# Patient Record
Sex: MALE | Race: WHITE | Employment: OTHER | URBAN - METROPOLITAN AREA
[De-identification: names, ages, dates, MRNs, and addresses within clinical notes are randomized per-mention and may not be internally consistent; named-entity substitution may affect disease eponyms.]

---

## 2024-07-11 ENCOUNTER — HOSPITAL ENCOUNTER (OUTPATIENT)
Age: 69
Setting detail: THERAPIES SERIES
Discharge: HOME | End: 2024-07-11
Payer: MEDICARE

## 2024-07-11 PROCEDURE — 97162 PT EVAL MOD COMPLEX 30 MIN: CPT | Performed by: PHYSICAL THERAPIST

## 2024-07-11 PROCEDURE — 97110 THERAPEUTIC EXERCISES: CPT | Performed by: PHYSICAL THERAPIST

## 2024-07-11 NOTE — CONSULTS
strengthening      Evaluation Complexity:  History (Personal factors, comorbidities) [] 0 [x] 1-2 [] 3+   Exam (limitations, restrictions) [] 1-2 [x] 3 [] 4+   Clinical presentation (progression) [x] Stable [] Evolving  [] Unstable   Decision Making [x] Low [] Moderate [] High    [] Low Complexity [x] Moderate Complexity [] High Complexity       Treatment Charges: Mins Units   [x] Evaluation       []  Low       [x]  Moderate       []  High 25 1   []  Modalities     [x]  Ther Exercise 22 2   [x]  Manual Therapy 6 0   []  Ther Activities     []  Aquatics     []  Vasocompression     []  Other 53 3     TOTAL BILLABLE TIME: 53'      Time in:0935     Time out:1020    Electronically signed by: ALAN CURRY PT        Physician Signature:________________________________Date:__________________  By signing above or cosigning this note, I have reviewed this plan of care and certify a need for medically necessary rehabilitation services.     *PLEASE SIGN ABOVE AND FAX BACK ALL PAGES*

## 2024-07-16 ENCOUNTER — HOSPITAL ENCOUNTER (OUTPATIENT)
Age: 69
Setting detail: THERAPIES SERIES
Discharge: HOME | End: 2024-07-16
Payer: MEDICARE

## 2024-07-16 PROCEDURE — 97140 MANUAL THERAPY 1/> REGIONS: CPT | Performed by: PHYSICAL THERAPIST

## 2024-07-16 PROCEDURE — 97110 THERAPEUTIC EXERCISES: CPT | Performed by: PHYSICAL THERAPIST

## 2024-07-16 NOTE — FLOWSHEET NOTE
[] [facility]  Outpatient Rehabilitation &  Therapy  [address]  [phone]  [phone] [] [facility]  Outpatient Rehabilitation &  Therapy  [address]  P: [phone]  F: [phone] [] [facility]  Outpatient Rehabilitation &  Therapy  [address]  P: [phone]  F: [phone] [] [facility]  Outpatient Rehabilitation &  Therapy  [address]  [phone]  [phone] [] [facility]  Outpatient Rehabilitation &  Therapy  [address]   P: [phone]  F: [phone]  [x] [facility]  Outpatient Rehabilitation &  Therapy  [address]  P: [phone]  F: [phone] [] [facility]  Outpatient Rehabilitation &  Therapy  [address]  P: [phone]  F: [phone] [] [facility]  Outpatient Rehabilitation &  Therapy  [address]  P: [phone]  F: [phone] [] [facility]  Outpatient Rehabilitation &  Therapy  [address]  P: [phone]  F: [phone]  [] [facility] Outpatient Rehabilitation &  Therapy  [address]  P: [phone]  F: [phone]     Physical Therapy Daily Treatment Note    Date:  2024  Patient Name:  Capo Brooks    :  1955  MRN: [id number]  Patient: Capo Brooks              : 1955                      MRN: [id number]  Physician: Tim Ordoñez MD                                        Insurance: MEDICARE PART A AND B   Medical Diagnosis: M51.36, M54.16              Rehab Codes: M54.50, M25.60  Onset Date: 7/15/23                                  Next 's appt: TBD   Visit# / total visits: ; Progress note for Medicare patient due at visit 10     Cancels/No Shows: 0/0  Estimated Medicare Charges to 24 : $194    Subjective:  Reports an

## 2024-07-18 ENCOUNTER — HOSPITAL ENCOUNTER (OUTPATIENT)
Age: 69
Setting detail: THERAPIES SERIES
Discharge: HOME | End: 2024-07-18
Payer: MEDICARE

## 2024-07-18 PROCEDURE — 97140 MANUAL THERAPY 1/> REGIONS: CPT | Performed by: PHYSICAL THERAPIST

## 2024-07-18 PROCEDURE — 97110 THERAPEUTIC EXERCISES: CPT | Performed by: PHYSICAL THERAPIST

## 2024-07-18 NOTE — FLOWSHEET NOTE
[] [facility]  Outpatient Rehabilitation &  Therapy  [address]  [phone]  [phone] [] [facility]  Outpatient Rehabilitation &  Therapy  [address]  P: [phone]  F: [phone] [] [facility]  Outpatient Rehabilitation &  Therapy  [address]  P: [phone]  F: [phone] [] [facility]  Outpatient Rehabilitation &  Therapy  [address]  [phone]  [phone] [] [facility]  Outpatient Rehabilitation &  Therapy  [address]   P: [phone]  F: [phone]  [x] [facility]  Outpatient Rehabilitation &  Therapy  [address]  P: [phone]  F: [phone] [] [facility]  Outpatient Rehabilitation &  Therapy  [address]  P: [phone]  F: [phone] [] [facility]  Outpatient Rehabilitation &  Therapy  [address]  P: [phone]  F: [phone] [] [facility]  Outpatient Rehabilitation &  Therapy  [address]  P: [phone]  F: [phone]  [] [facility] Outpatient Rehabilitation &  Therapy  [address]  P: [phone]  F: [phone]     Physical Therapy Daily Treatment Note    Date:  2024  Patient Name:  Capo Brooks    :  1955  MRN: [id number]  Patient: Capo Brooks              : 1955                      MRN: [id number]  Physician: Tim Ordoñez MD                                        Insurance: MEDICARE PART A AND B   Medical Diagnosis: M51.36, M54.16              Rehab Codes: M54.50, M25.60  Onset Date: 7/15/23                                  Next 's appt: TBD   Visit# / total visits: 3/12; Progress note for Medicare patient due at visit 10     Cancels/No Shows: 0/0  Estimated Medicare Charges to 24 : $194    Subjective:  Slight

## 2024-08-08 ENCOUNTER — HOSPITAL ENCOUNTER (OUTPATIENT)
Age: 69
Setting detail: THERAPIES SERIES
Discharge: HOME | End: 2024-08-08
Payer: MEDICARE

## 2024-08-08 PROCEDURE — 97140 MANUAL THERAPY 1/> REGIONS: CPT | Performed by: PHYSICAL THERAPIST

## 2024-08-08 PROCEDURE — 97110 THERAPEUTIC EXERCISES: CPT | Performed by: PHYSICAL THERAPIST

## 2024-08-08 NOTE — FLOWSHEET NOTE
LSP and begin appropriate strengthening  ? Pain: Eliminate constant L LBP to allow pain free sitting   ? ROM: Lumbar ext to 45 degrees to assist with symptom relief and control   LTG: (to be met in 12 treatments)  ? Strength: 5/5 L hip abd, ext to allow efficient and unrestricted amb  ? Function: Reduce Back Index to <10% deficits.   Patient to be independent with home exercise program as demonstrated by performance with correct form without cues.       Pt. Education:  [x] Yes  [] No  [x] Reviewed Prior HEP/Ed  Method of Education: [x] Verbal-Provided new sequence of ex, outlined rationale, reviewed mechanics    [x] Demo  [x] Written  Comprehension of Education:  [x] Verbalizes understanding.  [x] Demonstrates understanding.  [x] Needs review.  [] Demonstrates/verbalizes HEP/Ed previously given.     Access Code: [email or web address]  URL: [email or web address]  Date: 08/08/2024  Prepared by: Alan Curry    Exercises  - Supine Lower Trunk Rotation  - 1 x daily - 7 x weekly - 1 sets - 10 reps - 3 sec hold  - Supine Double Knee to Chest Modified  - 1 x daily - 7 x weekly - 1 sets - 10 reps - 5 sec hold  - Supine Hamstring Stretch  - 1 x daily - 7 x weekly - 1 sets - 10 reps - 3 sec hold  - Supine Bridge  - 1 x daily - 7 x weekly - 1 sets - 20 reps - 3 sec hold  - Reverse Crunch from 90/90 with Mini Swiss Ball Between Knees  - 1 x daily - 7 x weekly - 1 sets - 20 reps - 3 sec hold  - Straight Leg Raise with Opposite Hip and Knee Flexion  - 1 x daily - 7 x weekly - 1 sets - 20 reps - 3 sec hold  - Prone Press Up  - 1 x daily - 7 x weekly - 1 sets - 10 reps - 3 sec hold    Plan: [x] Continue current frequency toward long and short term goals.    [x] Specific Instructions for subsequent treatments: progress with stabilization and strengthening, along with extension approach.      Time In:  0815           Time Out: 0905    Electronically signed by:  ALAN CURRY, PT

## 2024-08-20 ENCOUNTER — APPOINTMENT (OUTPATIENT)
Dept: CT IMAGING | Age: 69
DRG: 149 | End: 2024-08-20
Payer: MEDICARE

## 2024-08-20 ENCOUNTER — APPOINTMENT (OUTPATIENT)
Dept: GENERAL RADIOLOGY | Age: 69
DRG: 149 | End: 2024-08-20
Payer: MEDICARE

## 2024-08-20 ENCOUNTER — HOSPITAL ENCOUNTER (INPATIENT)
Age: 69
LOS: 1 days | Discharge: HOME | DRG: 149 | End: 2024-08-21
Attending: STUDENT IN AN ORGANIZED HEALTH CARE EDUCATION/TRAINING PROGRAM | Admitting: STUDENT IN AN ORGANIZED HEALTH CARE EDUCATION/TRAINING PROGRAM
Payer: MEDICARE

## 2024-08-20 DIAGNOSIS — R42 DIZZINESS: Primary | ICD-10-CM

## 2024-08-20 PROBLEM — I10 ESSENTIAL HYPERTENSION: Status: ACTIVE | Noted: 2024-08-20

## 2024-08-20 PROBLEM — E87.6 HYPOKALEMIA: Status: ACTIVE | Noted: 2024-08-20

## 2024-08-20 PROBLEM — N40.0 BPH (BENIGN PROSTATIC HYPERPLASIA): Status: ACTIVE | Noted: 2024-08-20

## 2024-08-20 LAB
ANION GAP SERPL CALCULATED.3IONS-SCNC: 14 MMOL/L (ref 9–17)
BASOPHILS # BLD: 0.1 K/UL (ref 0–0.2)
BASOPHILS NFR BLD: 2 % (ref 0–2)
BUN SERPL-MCNC: 13 MG/DL (ref 8–23)
CALCIUM SERPL-MCNC: 8.8 MG/DL (ref 8.6–10.4)
CHLORIDE SERPL-SCNC: 97 MMOL/L (ref 98–107)
CO2 SERPL-SCNC: 23 MMOL/L (ref 20–31)
CREAT SERPL-MCNC: 0.7 MG/DL (ref 0.7–1.2)
EOSINOPHIL # BLD: 0.1 K/UL (ref 0–0.4)
EOSINOPHILS RELATIVE PERCENT: 3 % (ref 1–4)
ERYTHROCYTE [DISTWIDTH] IN BLOOD BY AUTOMATED COUNT: 13 % (ref 12.5–15.4)
GFR, ESTIMATED: >90 ML/MIN/1.73M2
GLUCOSE SERPL-MCNC: 155 MG/DL (ref 70–99)
HCT VFR BLD AUTO: 36.6 % (ref 41–53)
HGB BLD-MCNC: 12.3 G/DL (ref 13.5–17.5)
LYMPHOCYTES NFR BLD: 1.5 K/UL (ref 1–4.8)
LYMPHOCYTES RELATIVE PERCENT: 32 % (ref 24–44)
MCH RBC QN AUTO: 29.8 PG (ref 26–34)
MCHC RBC AUTO-ENTMCNC: 33.5 G/DL (ref 31–37)
MCV RBC AUTO: 88.8 FL (ref 80–100)
MONOCYTES NFR BLD: 0.5 K/UL (ref 0.1–1.2)
MONOCYTES NFR BLD: 11 % (ref 2–11)
NEUTROPHILS NFR BLD: 52 % (ref 36–66)
NEUTS SEG NFR BLD: 2.5 K/UL (ref 1.8–7.7)
PLATELET # BLD AUTO: 332 K/UL (ref 140–450)
PMV BLD AUTO: 6.7 FL (ref 6–12)
POTASSIUM SERPL-SCNC: 3.3 MMOL/L (ref 3.7–5.3)
RBC # BLD AUTO: 4.12 M/UL (ref 4.5–5.9)
SARS-COV-2 RDRP RESP QL NAA+PROBE: NOT DETECTED
SODIUM SERPL-SCNC: 134 MMOL/L (ref 135–144)
SPECIMEN DESCRIPTION: NORMAL
TROPONIN I SERPL HS-MCNC: 8 NG/L (ref 0–22)
WBC OTHER # BLD: 4.7 K/UL (ref 3.5–11)

## 2024-08-20 PROCEDURE — 6370000000 HC RX 637 (ALT 250 FOR IP): Performed by: NURSE PRACTITIONER

## 2024-08-20 PROCEDURE — 99285 EMERGENCY DEPT VISIT HI MDM: CPT

## 2024-08-20 PROCEDURE — 96374 THER/PROPH/DIAG INJ IV PUSH: CPT

## 2024-08-20 PROCEDURE — 1200000000 HC SEMI PRIVATE

## 2024-08-20 PROCEDURE — 99222 1ST HOSP IP/OBS MODERATE 55: CPT

## 2024-08-20 PROCEDURE — 70450 CT HEAD/BRAIN W/O DYE: CPT

## 2024-08-20 PROCEDURE — 70498 CT ANGIOGRAPHY NECK: CPT

## 2024-08-20 PROCEDURE — 71045 X-RAY EXAM CHEST 1 VIEW: CPT

## 2024-08-20 PROCEDURE — 93005 ELECTROCARDIOGRAM TRACING: CPT | Performed by: NURSE PRACTITIONER

## 2024-08-20 PROCEDURE — 6360000004 HC RX CONTRAST MEDICATION: Performed by: STUDENT IN AN ORGANIZED HEALTH CARE EDUCATION/TRAINING PROGRAM

## 2024-08-20 PROCEDURE — 6370000000 HC RX 637 (ALT 250 FOR IP): Performed by: STUDENT IN AN ORGANIZED HEALTH CARE EDUCATION/TRAINING PROGRAM

## 2024-08-20 PROCEDURE — 96375 TX/PRO/DX INJ NEW DRUG ADDON: CPT

## 2024-08-20 PROCEDURE — 2580000003 HC RX 258: Performed by: NURSE PRACTITIONER

## 2024-08-20 PROCEDURE — 85025 COMPLETE CBC W/AUTO DIFF WBC: CPT

## 2024-08-20 PROCEDURE — 6370000000 HC RX 637 (ALT 250 FOR IP)

## 2024-08-20 PROCEDURE — 6360000002 HC RX W HCPCS: Performed by: STUDENT IN AN ORGANIZED HEALTH CARE EDUCATION/TRAINING PROGRAM

## 2024-08-20 PROCEDURE — 80048 BASIC METABOLIC PNL TOTAL CA: CPT

## 2024-08-20 PROCEDURE — 84484 ASSAY OF TROPONIN QUANT: CPT

## 2024-08-20 PROCEDURE — 36415 COLL VENOUS BLD VENIPUNCTURE: CPT

## 2024-08-20 PROCEDURE — 2580000003 HC RX 258: Performed by: STUDENT IN AN ORGANIZED HEALTH CARE EDUCATION/TRAINING PROGRAM

## 2024-08-20 PROCEDURE — 6360000002 HC RX W HCPCS: Performed by: NURSE PRACTITIONER

## 2024-08-20 PROCEDURE — 87635 SARS-COV-2 COVID-19 AMP PRB: CPT

## 2024-08-20 RX ORDER — ONDANSETRON 2 MG/ML
4 INJECTION INTRAMUSCULAR; INTRAVENOUS EVERY 6 HOURS PRN
Status: DISCONTINUED | OUTPATIENT
Start: 2024-08-20 | End: 2024-08-21 | Stop reason: HOSPADM

## 2024-08-20 RX ORDER — POTASSIUM CHLORIDE 20 MEQ/1
40 TABLET, EXTENDED RELEASE ORAL ONCE
Status: COMPLETED | OUTPATIENT
Start: 2024-08-20 | End: 2024-08-20

## 2024-08-20 RX ORDER — TAMSULOSIN HYDROCHLORIDE 0.4 MG/1
0.4 CAPSULE ORAL DAILY
COMMUNITY

## 2024-08-20 RX ORDER — SODIUM CHLORIDE 0.9 % (FLUSH) 0.9 %
10 SYRINGE (ML) INJECTION PRN
Status: DISCONTINUED | OUTPATIENT
Start: 2024-08-20 | End: 2024-08-21 | Stop reason: HOSPADM

## 2024-08-20 RX ORDER — ONDANSETRON 4 MG/1
4 TABLET, ORALLY DISINTEGRATING ORAL EVERY 8 HOURS PRN
Status: DISCONTINUED | OUTPATIENT
Start: 2024-08-20 | End: 2024-08-21 | Stop reason: HOSPADM

## 2024-08-20 RX ORDER — 0.9 % SODIUM CHLORIDE 0.9 %
1000 INTRAVENOUS SOLUTION INTRAVENOUS ONCE
Status: COMPLETED | OUTPATIENT
Start: 2024-08-20 | End: 2024-08-20

## 2024-08-20 RX ORDER — MECLIZINE HCL 12.5 MG/1
12.5 TABLET ORAL 3 TIMES DAILY PRN
Status: DISCONTINUED | OUTPATIENT
Start: 2024-08-20 | End: 2024-08-21 | Stop reason: HOSPADM

## 2024-08-20 RX ORDER — ONDANSETRON 2 MG/ML
4 INJECTION INTRAMUSCULAR; INTRAVENOUS ONCE
Status: COMPLETED | OUTPATIENT
Start: 2024-08-20 | End: 2024-08-20

## 2024-08-20 RX ORDER — MAGNESIUM SULFATE IN WATER 40 MG/ML
2000 INJECTION, SOLUTION INTRAVENOUS PRN
Status: DISCONTINUED | OUTPATIENT
Start: 2024-08-20 | End: 2024-08-21 | Stop reason: HOSPADM

## 2024-08-20 RX ORDER — POLYETHYLENE GLYCOL 3350 17 G/17G
17 POWDER, FOR SOLUTION ORAL DAILY PRN
Status: DISCONTINUED | OUTPATIENT
Start: 2024-08-20 | End: 2024-08-21 | Stop reason: HOSPADM

## 2024-08-20 RX ORDER — ENOXAPARIN SODIUM 100 MG/ML
40 INJECTION SUBCUTANEOUS DAILY
Status: DISCONTINUED | OUTPATIENT
Start: 2024-08-20 | End: 2024-08-21 | Stop reason: HOSPADM

## 2024-08-20 RX ORDER — SODIUM CHLORIDE 9 MG/ML
INJECTION, SOLUTION INTRAVENOUS CONTINUOUS
Status: DISCONTINUED | OUTPATIENT
Start: 2024-08-20 | End: 2024-08-21 | Stop reason: HOSPADM

## 2024-08-20 RX ORDER — LORAZEPAM 2 MG/ML
1 INJECTION INTRAMUSCULAR ONCE
Status: COMPLETED | OUTPATIENT
Start: 2024-08-20 | End: 2024-08-20

## 2024-08-20 RX ORDER — MECLIZINE HCL 12.5 MG/1
25 TABLET ORAL ONCE
Status: COMPLETED | OUTPATIENT
Start: 2024-08-20 | End: 2024-08-20

## 2024-08-20 RX ORDER — TAMSULOSIN HYDROCHLORIDE 0.4 MG/1
0.4 CAPSULE ORAL DAILY
Status: DISCONTINUED | OUTPATIENT
Start: 2024-08-20 | End: 2024-08-21 | Stop reason: HOSPADM

## 2024-08-20 RX ORDER — LISINOPRIL 10 MG/1
10 TABLET ORAL DAILY
Status: DISCONTINUED | OUTPATIENT
Start: 2024-08-20 | End: 2024-08-21 | Stop reason: HOSPADM

## 2024-08-20 RX ORDER — ACETAMINOPHEN 650 MG/1
650 SUPPOSITORY RECTAL EVERY 6 HOURS PRN
Status: DISCONTINUED | OUTPATIENT
Start: 2024-08-20 | End: 2024-08-21 | Stop reason: HOSPADM

## 2024-08-20 RX ORDER — POTASSIUM CHLORIDE 20 MEQ/1
40 TABLET, EXTENDED RELEASE ORAL PRN
Status: DISCONTINUED | OUTPATIENT
Start: 2024-08-20 | End: 2024-08-21 | Stop reason: HOSPADM

## 2024-08-20 RX ORDER — 0.9 % SODIUM CHLORIDE 0.9 %
80 INTRAVENOUS SOLUTION INTRAVENOUS ONCE
Status: DISCONTINUED | OUTPATIENT
Start: 2024-08-20 | End: 2024-08-21 | Stop reason: HOSPADM

## 2024-08-20 RX ORDER — SODIUM CHLORIDE 9 MG/ML
INJECTION, SOLUTION INTRAVENOUS PRN
Status: DISCONTINUED | OUTPATIENT
Start: 2024-08-20 | End: 2024-08-21 | Stop reason: HOSPADM

## 2024-08-20 RX ORDER — SODIUM CHLORIDE 0.9 % (FLUSH) 0.9 %
5-40 SYRINGE (ML) INJECTION PRN
Status: DISCONTINUED | OUTPATIENT
Start: 2024-08-20 | End: 2024-08-21 | Stop reason: HOSPADM

## 2024-08-20 RX ORDER — LISINOPRIL 10 MG/1
10 TABLET ORAL DAILY
COMMUNITY

## 2024-08-20 RX ORDER — POTASSIUM CHLORIDE 7.45 MG/ML
10 INJECTION INTRAVENOUS PRN
Status: DISCONTINUED | OUTPATIENT
Start: 2024-08-20 | End: 2024-08-21 | Stop reason: HOSPADM

## 2024-08-20 RX ORDER — ACETAMINOPHEN 325 MG/1
650 TABLET ORAL EVERY 6 HOURS PRN
Status: DISCONTINUED | OUTPATIENT
Start: 2024-08-20 | End: 2024-08-21 | Stop reason: HOSPADM

## 2024-08-20 RX ORDER — SODIUM CHLORIDE 0.9 % (FLUSH) 0.9 %
5-40 SYRINGE (ML) INJECTION EVERY 12 HOURS SCHEDULED
Status: DISCONTINUED | OUTPATIENT
Start: 2024-08-20 | End: 2024-08-21 | Stop reason: HOSPADM

## 2024-08-20 RX ADMIN — ENOXAPARIN SODIUM 40 MG: 100 INJECTION SUBCUTANEOUS at 20:41

## 2024-08-20 RX ADMIN — SODIUM CHLORIDE, PRESERVATIVE FREE 10 ML: 5 INJECTION INTRAVENOUS at 15:01

## 2024-08-20 RX ADMIN — SODIUM CHLORIDE 1000 ML: 9 INJECTION, SOLUTION INTRAVENOUS at 12:51

## 2024-08-20 RX ADMIN — IOPAMIDOL 75 ML: 755 INJECTION, SOLUTION INTRAVENOUS at 15:01

## 2024-08-20 RX ADMIN — MECLIZINE 12.5 MG: 12.5 TABLET ORAL at 20:41

## 2024-08-20 RX ADMIN — SODIUM CHLORIDE, PRESERVATIVE FREE 10 ML: 5 INJECTION INTRAVENOUS at 20:41

## 2024-08-20 RX ADMIN — TAMSULOSIN HYDROCHLORIDE 0.4 MG: 0.4 CAPSULE ORAL at 20:41

## 2024-08-20 RX ADMIN — ONDANSETRON 4 MG: 2 INJECTION INTRAMUSCULAR; INTRAVENOUS at 20:41

## 2024-08-20 RX ADMIN — SODIUM CHLORIDE: 9 INJECTION, SOLUTION INTRAVENOUS at 20:47

## 2024-08-20 RX ADMIN — ONDANSETRON 4 MG: 2 INJECTION INTRAMUSCULAR; INTRAVENOUS at 12:52

## 2024-08-20 RX ADMIN — Medication 80 ML: at 15:02

## 2024-08-20 RX ADMIN — SODIUM CHLORIDE 1000 ML: 9 INJECTION, SOLUTION INTRAVENOUS at 14:03

## 2024-08-20 RX ADMIN — LORAZEPAM 1 MG: 2 INJECTION, SOLUTION INTRAMUSCULAR; INTRAVENOUS at 15:52

## 2024-08-20 RX ADMIN — POTASSIUM CHLORIDE 40 MEQ: 1500 TABLET, EXTENDED RELEASE ORAL at 20:03

## 2024-08-20 RX ADMIN — MECLIZINE 25 MG: 12.5 TABLET ORAL at 12:53

## 2024-08-20 ASSESSMENT — ENCOUNTER SYMPTOMS
DIARRHEA: 0
SHORTNESS OF BREATH: 0
ABDOMINAL PAIN: 0
COUGH: 0
WHEEZING: 0
CONSTIPATION: 0
VOMITING: 1
NAUSEA: 1

## 2024-08-20 ASSESSMENT — PAIN - FUNCTIONAL ASSESSMENT: PAIN_FUNCTIONAL_ASSESSMENT: NONE - DENIES PAIN

## 2024-08-20 NOTE — ED PROVIDER NOTES
[facility] Emergency Department      Pt Name: Capo Brooks  MRN: [id number]  Birthdate 1955  Date of evaluation: 8/20/2024    EMERGENCY DEPARTMENT ENCOUNTER      PERTINENT ATTENDING PHYSICIAN COMMENTS:      Faculty Attestation    I performed a history and physical examination of the patient and discussed management with the mid level provideer. I reviewed the mid level provider's note and agree with the documented findings and plan of care.Any areas of disagreement are noted on the chart. I was personally present for the key portions of any procedures. I have documented in the chart those procedures where I was not present during the key portions. I have reviewed the emergency nurses triage note. I agree with the chief complaint, past medical history, past surgical history, allergies, medications, social and family history as documented unless otherwise noted below. Documentation of the HPI, Physical Exam and Medical Decision Making performed by medical students or scribes is based on my personal performance of the HPI, PE and MDM. For Residents/Physician Assistant/ Nurse Practitioner cases/documentation I have personally evaluated this patient and have completed at least one if not all key elements of the E/M (history, physical exam, and MDM). Additional findings are as noted.    CHIEF COMPLAINT       Chief Complaint   Patient presents with    Dizziness    Nausea    Emesis     Pt arrives via ems from home dt co nausea, emesis, and dizziness.        HISTORY OF PRESENT ILLNESS    Capo Brooks is a 69 y.o. male who presents to the emergency department via EMS due to nausea, emesis and dizziness.  Denies any syncopal episode.  Denies any chest pain, difficulty breathing, numbness or tingling.  Patient reports that he has had similar dizziness in the past but it is never lasted this long, never been evaluated for the symptoms.  He denies any cough or congestion.  Denies any visual changes, was given 
Quality 110: Preventive Care And Screening: Influenza Immunization: Influenza Immunization not Administered for Documented Reasons.
Quality 431: Preventive Care And Screening: Unhealthy Alcohol Use - Screening: Patient identified as an unhealthy alcohol user when screened for unhealthy alcohol use using a systematic screening method and received brief counseling
Quality 130: Documentation Of Current Medications In The Medical Record: Current Medications Documented
Quality 111:Pneumonia Vaccination Status For Older Adults: Pneumococcal Vaccination not Administered or Previously Received, Reason not Otherwise Specified
is intact.      Motor: Motor function is intact. No weakness, abnormal muscle tone or pronator drift.      Coordination: Coordination is intact. Coordination normal. Finger-Nose-Finger Test normal.      Comments: Pupils area equal and reactive.          MEDICAL DECISION MAKING     This is a nontoxic-appearing 69-year-old male who presents to the emergency department via EMS for evaluation evaluation of dizziness nausea and vomiting that began this morning around 11.  Patient has history of vertigo in the past.  CBC shows mildly decreased hemoglobin at 12.3, chemistry shows sodium 134 potassium 3.3 chloride 97 glucose 155 high-sensitivity troponin negative at 8.  EKG without any acute findings interpreted by my attending physician chest x-ray with no acute process.  CT of brain no inner cranial abnormality.  CTA head and neck with contrast shows unremarkable CTA of head and neck.  Patient was given dose of meclizine that did not make any improvement.  He is given a liter of fluids.  Patient states the meclizine relieved his nausea, but his dizziness is unchanged.  Patient was then given a dose of Ativan and attempt to treat the vertigo.  Dizziness continued.  Patient states does not feel he is able to go home.  Admission recommended.  Patient is agreeable.  Spoke to hospitalist who graciously accepted admission.  Patient will be in the hospital for further care and evaluation  DIAGNOSTIC RESULTS     EKG: All EKG's are interpreted by the Emergency Department Physician who either signs or Co-signs this chart in the absence of a cardiologist.        RADIOLOGY:   Non-plain film images such as CT, Ultrasound and MRI are read by the radiologist. Plain radiographic images are visualized and preliminarily interpreted by the emergency physician with the below findings:      Interpretation per the Radiologist below, if available at the time of this note:    CTA HEAD NECK W CONTRAST   Final Result   Unremarkable CTA of the head 
Quality 226: Preventive Care And Screening: Tobacco Use: Screening And Cessation Intervention: Patient screened for tobacco use and is an ex/non-smoker
Detail Level: Detailed
Quality 137: Melanoma: Continuity Of Care - Recall System: Patient information entered into a recall system that includes: target date for the next exam specified AND a process to follow up with patients regarding missed or unscheduled appointments

## 2024-08-20 NOTE — H&P
[facility]  Office: [phone]  Wilfrid Iqbal DO, Richmond Wynne DO, Jd Sawant DO, Rj Perez DO, Ora Pagan MD, Rabia Aguilar MD, Hector Wong MD, Jennifer Penny MD,  Kevin Monroy MD, Darci Baron MD, Merlyn Gonzales MD,  Aquiles Kwan DO, Marina Del Real MD, Evin Rojas MD, Dimas Iqbal DO, Joann Martins MD,  Aristeo Curry DO, Lamar Armando MD, Carmen Babcock MD, Ivett Renteria MD, Saloni Weinstein MD,  Fahad Rothman MD, Eliecer Hernandez MD, Harrison Ruggiero MD, Juanita Toney MD, Chava Gonsales MD, Florentin Ireland MD, Chele Perez DO, Carrillo Ngo DO, David Parker MD,  Deo Malagon MD, Shirley Waterhouse, CNP,  April Bell, CNP, Jordan Peters, CNP,  Sharron Boyd, DNP, Sarah Mckeon, CNP, Cesilia Holland, CNP, Veronika aDy, CNP, Dot Gonzalez, CNP, Jelly Mckenzie, PA-C, Mary Cummings PA-C, Caro Covington, CNP, Gila Hayward, CNP, Constanza Hairston, CNP, Adele Kebede, CNP, Laura Enriquez, CNS, Shanique King, CNP, Denita Pickard CNP, Tracy Schwab, CNP         [facility]   IN-PATIENT SERVICE   [facility]    HISTORY AND PHYSICAL EXAMINATION            Date:   8/20/2024  Patient name:  Capo Brooks  Date of admission:  8/20/2024 12:22 PM  MRN:   [id number]  Account:  [id number]  YOB: 1955  PCP:    Tim Ordoñez MD  Room:   AMY/AMY  Code Status:    No Order    Chief Complaint:     Chief Complaint   Patient presents with    Dizziness    Nausea    Emesis     Pt arrives via ems from home dt co nausea, emesis, and dizziness.      History Obtained From:     patient, electronic medical record    History of Present Illness:     Capo Brooks is a 69 y.o. Non- / non  male who presents with Dizziness, Nausea, and Emesis (Pt arrives via ems from home dt co nausea, emesis, and dizziness. )   and is admitted to the hospital for the management of Dizziness.    Patient is a very pleasant 69-year-old male who is

## 2024-08-21 ENCOUNTER — APPOINTMENT (OUTPATIENT)
Dept: MRI IMAGING | Age: 69
DRG: 149 | End: 2024-08-21
Payer: MEDICARE

## 2024-08-21 ENCOUNTER — APPOINTMENT (OUTPATIENT)
Age: 69
DRG: 149 | End: 2024-08-21
Payer: MEDICARE

## 2024-08-21 VITALS
RESPIRATION RATE: 16 BRPM | OXYGEN SATURATION: 100 % | SYSTOLIC BLOOD PRESSURE: 136 MMHG | WEIGHT: 143.3 LBS | BODY MASS INDEX: 18.99 KG/M2 | DIASTOLIC BLOOD PRESSURE: 94 MMHG | HEIGHT: 73 IN | HEART RATE: 66 BPM | TEMPERATURE: 97.7 F

## 2024-08-21 LAB
ANION GAP SERPL CALCULATED.3IONS-SCNC: 9 MMOL/L (ref 9–17)
BASOPHILS # BLD: 0 K/UL (ref 0–0.2)
BASOPHILS NFR BLD: 1 % (ref 0–2)
BUN SERPL-MCNC: 7 MG/DL (ref 8–23)
CALCIUM SERPL-MCNC: 8.2 MG/DL (ref 8.6–10.4)
CHLORIDE SERPL-SCNC: 101 MMOL/L (ref 98–107)
CO2 SERPL-SCNC: 24 MMOL/L (ref 20–31)
CREAT SERPL-MCNC: 0.7 MG/DL (ref 0.7–1.2)
ECHO AO ROOT DIAM: 3.4 CM
ECHO AO ROOT INDEX: 1.83 CM/M2
ECHO AV AREA PEAK VELOCITY: 3.1 CM2
ECHO AV AREA VTI: 2.9 CM2
ECHO AV AREA/BSA PEAK VELOCITY: 1.7 CM2/M2
ECHO AV AREA/BSA VTI: 1.6 CM2/M2
ECHO AV MEAN GRADIENT: 4 MMHG
ECHO AV MEAN VELOCITY: 1 M/S
ECHO AV PEAK GRADIENT: 8 MMHG
ECHO AV PEAK VELOCITY: 1.4 M/S
ECHO AV VELOCITY RATIO: 0.86
ECHO AV VTI: 30.2 CM
ECHO BSA: 1.83 M2
ECHO EST RA PRESSURE: 3 MMHG
ECHO IVC PROX: 1.7 CM
ECHO LA AREA 2C: 18.2 CM2
ECHO LA AREA 4C: 18 CM2
ECHO LA DIAMETER INDEX: 1.61 CM/M2
ECHO LA DIAMETER: 3 CM
ECHO LA MAJOR AXIS: 5.2 CM
ECHO LA MINOR AXIS: 5.2 CM
ECHO LA TO AORTIC ROOT RATIO: 0.88
ECHO LA VOL BP: 51 ML (ref 18–58)
ECHO LA VOL MOD A2C: 50 ML (ref 18–58)
ECHO LA VOL MOD A4C: 51 ML (ref 18–58)
ECHO LA VOL/BSA BIPLANE: 27 ML/M2 (ref 16–34)
ECHO LA VOLUME INDEX MOD A2C: 27 ML/M2 (ref 16–34)
ECHO LA VOLUME INDEX MOD A4C: 27 ML/M2 (ref 16–34)
ECHO LV E' LATERAL VELOCITY: 13 CM/S
ECHO LV E' SEPTAL VELOCITY: 10 CM/S
ECHO LV EDV A2C: 107 ML
ECHO LV EDV A4C: 98 ML
ECHO LV EDV INDEX A4C: 53 ML/M2
ECHO LV EDV NDEX A2C: 58 ML/M2
ECHO LV EF PHYSICIAN: 58 %
ECHO LV EJECTION FRACTION A2C: 58 %
ECHO LV EJECTION FRACTION A4C: 56 %
ECHO LV EJECTION FRACTION BIPLANE: 58 % (ref 55–100)
ECHO LV ESV A2C: 45 ML
ECHO LV ESV A4C: 44 ML
ECHO LV ESV INDEX A2C: 24 ML/M2
ECHO LV ESV INDEX A4C: 24 ML/M2
ECHO LV FRACTIONAL SHORTENING: 52 % (ref 28–44)
ECHO LV INTERNAL DIMENSION DIASTOLE INDEX: 2.47 CM/M2
ECHO LV INTERNAL DIMENSION DIASTOLIC: 4.6 CM (ref 4.2–5.9)
ECHO LV INTERNAL DIMENSION SYSTOLIC INDEX: 1.18 CM/M2
ECHO LV INTERNAL DIMENSION SYSTOLIC: 2.2 CM
ECHO LV IVSD: 1 CM (ref 0.6–1)
ECHO LV MASS 2D: 158.8 G (ref 88–224)
ECHO LV MASS INDEX 2D: 85.4 G/M2 (ref 49–115)
ECHO LV POSTERIOR WALL DIASTOLIC: 1 CM (ref 0.6–1)
ECHO LV RELATIVE WALL THICKNESS RATIO: 0.43
ECHO LVOT AREA: 3.8 CM2
ECHO LVOT AV VTI INDEX: 0.77
ECHO LVOT DIAM: 2.2 CM
ECHO LVOT MEAN GRADIENT: 3 MMHG
ECHO LVOT PEAK GRADIENT: 5 MMHG
ECHO LVOT PEAK VELOCITY: 1.2 M/S
ECHO LVOT STROKE VOLUME INDEX: 47.4 ML/M2
ECHO LVOT SV: 88.1 ML
ECHO LVOT VTI: 23.2 CM
ECHO MV A VELOCITY: 0.57 M/S
ECHO MV E DECELERATION TIME (DT): 171 MS
ECHO MV E VELOCITY: 0.85 M/S
ECHO MV E/A RATIO: 1.49
ECHO MV E/E' LATERAL: 6.54
ECHO MV E/E' RATIO (AVERAGED): 7.52
ECHO MV E/E' SEPTAL: 8.5
ECHO RIGHT VENTRICULAR SYSTOLIC PRESSURE (RVSP): 30 MMHG
ECHO RV TAPSE: 2.2 CM (ref 1.7–?)
ECHO TV REGURGITANT MAX VELOCITY: 2.58 M/S
ECHO TV REGURGITANT PEAK GRADIENT: 27 MMHG
EKG ATRIAL RATE: 58 BPM
EKG P AXIS: 77 DEGREES
EKG P-R INTERVAL: 184 MS
EKG Q-T INTERVAL: 460 MS
EKG QRS DURATION: 120 MS
EKG QTC CALCULATION (BAZETT): 451 MS
EKG R AXIS: 75 DEGREES
EKG T AXIS: 73 DEGREES
EKG VENTRICULAR RATE: 58 BPM
EOSINOPHIL # BLD: 0.1 K/UL (ref 0–0.4)
EOSINOPHILS RELATIVE PERCENT: 3 % (ref 1–4)
ERYTHROCYTE [DISTWIDTH] IN BLOOD BY AUTOMATED COUNT: 13.1 % (ref 12.5–15.4)
GFR, ESTIMATED: >90 ML/MIN/1.73M2
GLUCOSE SERPL-MCNC: 136 MG/DL (ref 70–99)
HCT VFR BLD AUTO: 33.5 % (ref 41–53)
HGB BLD-MCNC: 11.4 G/DL (ref 13.5–17.5)
LYMPHOCYTES NFR BLD: 1.1 K/UL (ref 1–4.8)
LYMPHOCYTES RELATIVE PERCENT: 27 % (ref 24–44)
MCH RBC QN AUTO: 29.9 PG (ref 26–34)
MCHC RBC AUTO-ENTMCNC: 34.2 G/DL (ref 31–37)
MCV RBC AUTO: 87.5 FL (ref 80–100)
MONOCYTES NFR BLD: 0.5 K/UL (ref 0.1–1.2)
MONOCYTES NFR BLD: 12 % (ref 2–11)
NEUTROPHILS NFR BLD: 57 % (ref 36–66)
NEUTS SEG NFR BLD: 2.3 K/UL (ref 1.8–7.7)
PLATELET # BLD AUTO: 288 K/UL (ref 140–450)
PMV BLD AUTO: 6.6 FL (ref 6–12)
POTASSIUM SERPL-SCNC: 4 MMOL/L (ref 3.7–5.3)
RBC # BLD AUTO: 3.83 M/UL (ref 4.5–5.9)
SODIUM SERPL-SCNC: 134 MMOL/L (ref 135–144)
WBC OTHER # BLD: 4 K/UL (ref 3.5–11)

## 2024-08-21 PROCEDURE — 6360000002 HC RX W HCPCS: Performed by: STUDENT IN AN ORGANIZED HEALTH CARE EDUCATION/TRAINING PROGRAM

## 2024-08-21 PROCEDURE — 93306 TTE W/DOPPLER COMPLETE: CPT

## 2024-08-21 PROCEDURE — 36415 COLL VENOUS BLD VENIPUNCTURE: CPT

## 2024-08-21 PROCEDURE — 6370000000 HC RX 637 (ALT 250 FOR IP): Performed by: PSYCHIATRY & NEUROLOGY

## 2024-08-21 PROCEDURE — 93306 TTE W/DOPPLER COMPLETE: CPT | Performed by: INTERNAL MEDICINE

## 2024-08-21 PROCEDURE — 70551 MRI BRAIN STEM W/O DYE: CPT

## 2024-08-21 PROCEDURE — 85025 COMPLETE CBC W/AUTO DIFF WBC: CPT

## 2024-08-21 PROCEDURE — 99223 1ST HOSP IP/OBS HIGH 75: CPT | Performed by: PSYCHIATRY & NEUROLOGY

## 2024-08-21 PROCEDURE — 97116 GAIT TRAINING THERAPY: CPT

## 2024-08-21 PROCEDURE — 80048 BASIC METABOLIC PNL TOTAL CA: CPT

## 2024-08-21 PROCEDURE — 99232 SBSQ HOSP IP/OBS MODERATE 35: CPT | Performed by: STUDENT IN AN ORGANIZED HEALTH CARE EDUCATION/TRAINING PROGRAM

## 2024-08-21 PROCEDURE — 97162 PT EVAL MOD COMPLEX 30 MIN: CPT

## 2024-08-21 PROCEDURE — 6370000000 HC RX 637 (ALT 250 FOR IP)

## 2024-08-21 RX ORDER — ASPIRIN 81 MG/1
81 TABLET, CHEWABLE ORAL DAILY
Qty: 30 TABLET | Refills: 3 | Status: SHIPPED | OUTPATIENT
Start: 2024-08-22

## 2024-08-21 RX ORDER — ATORVASTATIN CALCIUM 40 MG/1
80 TABLET, FILM COATED ORAL NIGHTLY
Status: DISCONTINUED | OUTPATIENT
Start: 2024-08-21 | End: 2024-08-21 | Stop reason: HOSPADM

## 2024-08-21 RX ORDER — MECLIZINE HCL 12.5 MG/1
25 TABLET ORAL 3 TIMES DAILY PRN
Qty: 90 TABLET | Refills: 2 | Status: SHIPPED | OUTPATIENT
Start: 2024-08-21 | End: 2024-11-19

## 2024-08-21 RX ORDER — ATORVASTATIN CALCIUM 80 MG/1
80 TABLET, FILM COATED ORAL NIGHTLY
Qty: 30 TABLET | Refills: 3 | Status: SHIPPED | OUTPATIENT
Start: 2024-08-21

## 2024-08-21 RX ORDER — MECLIZINE HCL 12.5 MG/1
12.5 TABLET ORAL EVERY 8 HOURS
Status: DISCONTINUED | OUTPATIENT
Start: 2024-08-21 | End: 2024-08-21 | Stop reason: HOSPADM

## 2024-08-21 RX ORDER — ASPIRIN 81 MG/1
81 TABLET, CHEWABLE ORAL DAILY
Status: DISCONTINUED | OUTPATIENT
Start: 2024-08-21 | End: 2024-08-21 | Stop reason: HOSPADM

## 2024-08-21 RX ADMIN — TAMSULOSIN HYDROCHLORIDE 0.4 MG: 0.4 CAPSULE ORAL at 09:46

## 2024-08-21 RX ADMIN — LISINOPRIL 10 MG: 10 TABLET ORAL at 09:46

## 2024-08-21 RX ADMIN — MECLIZINE 12.5 MG: 12.5 TABLET ORAL at 14:36

## 2024-08-21 RX ADMIN — ASPIRIN 81 MG: 81 TABLET, CHEWABLE ORAL at 17:21

## 2024-08-21 NOTE — PLAN OF CARE
Problem: Discharge Planning  Goal: Discharge to home or other facility with appropriate resources  Outcome: Progressing  Flowsheets (Taken 8/20/2024 2035)  Discharge to home or other facility with appropriate resources: Identify barriers to discharge with patient and caregiver

## 2024-08-21 NOTE — PLAN OF CARE
Problem: Discharge Planning  Goal: Discharge to home or other facility with appropriate resources  8/21/2024 1146 by Jose Daniel Vuong LPN  Outcome: Progressing  8/21/2024 0354 by Herber Clements RN  Outcome: Progressing  Flowsheets (Taken 8/20/2024 2035)  Discharge to home or other facility with appropriate resources: Identify barriers to discharge with patient and caregiver     Problem: Safety - Adult  Goal: Free from fall injury  Outcome: Progressing

## 2024-08-21 NOTE — PLAN OF CARE
Problem: Discharge Planning  Goal: Discharge to home or other facility with appropriate resources  8/21/2024 1918 by Jose Daniel Vuong LPN  Outcome: Progressing  8/21/2024 1918 by Jose Daniel Vuong LPN  Outcome: Progressing  8/21/2024 1146 by Jose Daniel Vuong LPN  Outcome: Progressing     Problem: Safety - Adult  Goal: Free from fall injury  8/21/2024 1918 by Jose Daniel Vuong LPN  Outcome: Progressing  8/21/2024 1918 by Jose Daniel Vuong LPN  Outcome: Progressing  8/21/2024 1146 by Jose Daniel Vuong LPN  Outcome: Progressing

## 2024-08-21 NOTE — CONSULTS
NEUROLOGY INPATIENT CONSULT NOTE    8/21/2024         Capo Brooks is a  69 y.o. male admitted on 8/20/2024 with  Dizziness [R42]    Reason for consult: Dizziness with nystagmus  History is obtained mostly from the patient and the medical record and from the caregivers. Chart is reviewed and patient is examined.   Briefly, this is a  69 y.o. male admitted on 8/20/2024 with acute onset of dizziness with vertigo and nausea and unsteady gait.  He stated that he was in his usual state of health until yesterday morning.  He woke up at about 7:30 AM and he was in his usual state of health until 9:30 AM and then he suddenly started having severe dizziness with vertigo at extreme severe intensity worsening with any head movement.  His dizziness was getting worse when he was driving associated with nausea.  Symptoms were intermittent associated with gait difficulties and also has had tendency to fall to either side with the loss of balance.  He did not have any associated headache but admits to having hearing impairment and describes it as \"chronic\".  He also has chronic tinnitus described as \"high pitched and seashells\" and no change in it.  Denied earache and facial numbness and weakness.  Denied speech and swallowing difficulties.  His dizziness was getting worse with any positional change.  Since admitted, he had a CT head and CTA head and neck and those were unremarkable.  Symptoms lasted for several hours yesterday with improvement \"about 80%\" in dizziness/ vertigo.  He is able to ambulate today without any difficulties.  Nausea/vomiting also improved.      No current facility-administered medications on file prior to encounter.     Current Outpatient Medications on File Prior to Encounter   Medication Sig Dispense Refill    lisinopril (PRINIVIL;ZESTRIL) 10 MG tablet Take 1 tablet by mouth daily      tamsulosin (FLOMAX) 0.4 MG capsule Take 1 capsule by mouth daily       Allergies: Capo Brooks is allergic to

## 2024-08-21 NOTE — PROGRESS NOTES
[facility]  Occupational Therapy Not Seen Note    DATE: 2024    NAME: Capo Brooks  MRN: [id number]   : 1955      Patient not seen this date for Occupational Therapy due to:    Other: OT orders received. Checked in on pt this AM. Pt reporting no acute OT needs. Pt feels that he is able to all he needs to do to safely return to previous environment with family support. Pt declined acute OT at this time. Will discharge pt from OT at this time, educated pt that therapy can be reordered if new needs should arise.     Electronically signed by MOHIT WOO OT on 2024 at 12:04 PM    
Physical Therapy  Facility/Department: [address]  Physical Therapy Initial Assessment    Name: Capo Brooks  : 1955  MRN: [id number]  Date of Service: 2024  Chief Complaint   Patient presents with    Dizziness    Nausea    Emesis     Pt arrives via ems from home dt co nausea, emesis, and dizziness.          Discharge Recommendations:  Patient would benefit from continued therapy after discharge   PT Equipment Recommendations  Equipment Needed: Yes  Mobility Devices: Walker  Walker: Rolling  Other: pt 6ft 1 inch tall; RW best but if pt refuses RW, then would benefit from standard cane at least      Patient Diagnosis(es): The encounter diagnosis was Dizziness.  Past Medical History:  has no past medical history on file.  Past Surgical History:  has no past surgical history on file.    Assessment   Body Structures, Functions, Activity Limitations Requiring Skilled Therapeutic Intervention: Decreased functional mobility ;Decreased balance;Decreased safe awareness;Decreased high-level IADLs  Assessment: Pt is semi retired  who lives w/ wife in 3 story home built in [place] and normally walks w/out device and has attended out pt PT at [facility] beds at the [facility] for sciatica but now has symptoms of vertigo which are less than at admit. At PT evaluation, he was modified independent w/ log rolling bed mobility w/ education compensatory techniques. He was able to transfer modified indep w/ RW and cane w/ education hand placement safety. He was able to walk 200ft w/ RW progressing to close supervision for safety and balance due to mild to minimal vertigo symptoms and safety reminders as pt tends to move fast and reminders compensatory techniques. He walked in room 50ft w/ cane and SBA w/ same reminders. He was able to go up/down 14 steps w/ L rail w/ education one step at a time if vertigo symptoms but was able to ascend reciprocally w/ SBA. Pt should be able to return home w/ wife but would be best w/ RW available for 
8/20/2024    ADDENDUM: The results were conveyed to Manisha Huertas on 08/20/2024 at 1:10 p.m.     Result Date: 8/20/2024  No acute intracranial abnormality.       Physical Examination:       General appearance:  alert, cooperative and no distress  Mental Status:  oriented to person, place and time and normal affect  Lungs:  clear to auscultation bilaterally, normal effort  Heart:  regular rate and rhythm, no murmur  Abdomen:  soft, nontender, nondistended, normal bowel sounds, no masses, hepatomegaly, splenomegaly  Extremities:  no edema, redness, tenderness in the calves  Skin:  no gross lesions, rashes, induration    Assessment:     Hospital Problems             Last Modified POA    * (Principal) Dizziness 8/20/2024 Yes    Hypokalemia 8/20/2024 Yes    Essential hypertension 8/20/2024 Yes    BPH (benign prostatic hyperplasia) 8/20/2024 Yes       Plan:     -Continue IV fluids for now,  -Continue meclizine, appreciate neurology service recommendations, pending MRI brain, continue PT OT, will give prescription for vestibular rehab  -Antiemetics as needed  -Started on aspirin/Lipitor for stroke prevention  -Continue rest of the home meds as tolerated    Harrison Shaw Sra, MD  8/21/2024  6:10 PM

## 2024-08-22 ENCOUNTER — HOSPITAL ENCOUNTER (OUTPATIENT)
Age: 69
Setting detail: THERAPIES SERIES
Discharge: HOME | End: 2024-08-22
Payer: MEDICARE

## 2024-08-22 PROCEDURE — 97110 THERAPEUTIC EXERCISES: CPT

## 2024-08-22 PROCEDURE — 97116 GAIT TRAINING THERAPY: CPT

## 2024-08-22 PROCEDURE — 95992 CANALITH REPOSITIONING PROC: CPT

## 2024-08-22 NOTE — CONSULTS
[] [facility]  Outpatient Rehabilitation &  Therapy  [address]  [phone]  F: [phone] [] [facility]  Outpatient Rehabilitation &  Therapy  [address]  P: [phone]  F: [phone] [] [facility]  Outpatient Rehabilitation &  Therapy  [address]  P: [phone]  F: [phone] [] [facility]  Outpatient Rehabilitation &  Therapy  [address]  P: [phone]  F: [phone] [] [facility]  Outpatient Rehabilitation &  Therapy  [address]   P: [phone]  F: [phone]  [x] [facility]  Outpatient Rehabilitation &  Therapy  [address]   P: [phone]  F: [phone] [] [facility]  Outpatient Rehabilitation &  Therapy  [address]  P: [phone]  F: [phone] [] [facility]  Outpatient Rehabilitation &  Therapy  [address]  P: [phone]  F: [phone] [] [facility]  Outpatient Rehabilitation &  Therapy  [address]  P: [phone]  F: [phone]      Physical Therapy Vestibular Rehab Evaluation    Date:  2024  Patient: Capo Brooks  : 1955  MRN: [id number]  Physician: Tim Ordoñez MD   Insurance:  MEDICARE PART A AND B   Medical Diagnosis: R42 Dizziness  Rehab Codes: R42, R26.89, H81.13  Onset date: 2024  Next 's appt.: TBD    Subjective:   CC: vertigo, spinning, nausea , emesis, Imbalance, difficulty walking, quick head turns L > R, imbalance after exit bed R but no dizziness, turn L corner, bending, Looking up.  HPI: (onset date)Acute onset at dentist vertigo, spinning, nausea , emesis seen in ER on 2024. Had a minor version 3 months ago and it resolved    Comorbidities:   [] Obesity [] Dialysis  [] Other:   [] Asthma/COPD [] Dementia [] Other:   [] Stroke [] Sleep

## 2024-08-22 NOTE — DISCHARGE SUMMARY
[facility]  Office: [phone]  Wilfrid Iqbal DO, Richmond Wynne DO, Jd Sawant DO, Rj Perez DO, Ora Pagan MD, Rabia Aguilar MD, Hector Wong MD, Jennifer Penny MD,  Kevin Monroy MD, Darci Baron MD, Merlyn Gonzales MD,  Aquiles Kwan DO, Marina Del Real MD, Evin Rojas MD, Dimas Iqbal DO, Joann Martins MD,  Aristeo Curry DO, Lamar Armando MD, Carmen Babcock MD, Ivett Renteria MD, Saloni Weinstein MD,  Fahad Rothman MD, Eliecer Hernandez MD, Harrison Ruggiero MD, Juanita Toney MD, Chava Gonsales MD, Florentin Ireland MD, Chele Perez DO, Carrillo Ngo DO, David Parker MD,  Deo Malagon MD, Shirley Waterhouse, CNP,  April Bell, CNP, Jordan Peters, CNP,  Sharron Boyd, DNP, Sarah Mckeon, CNP, Cesilia Holland, CNP, Veronika Day, CNP, Dot Gonzalez, CNP, DAVE Christine-YAS, DAVE Boles-C, Caro Covington, CNP, Gila Hayward, CNP, Constanza Hairston, CNP, Adele Kebede, CNP, Laura Enriquez, CNS, Shanique King, CNP, Denita Pickard, CNP, Tracy Schwab, CNP         [facility]   IN-PATIENT SERVICE   [facility]    Discharge Summary     Patient ID: Capo Brooks  :  1955   MRN: [id number]     ACCOUNT:  [id number]   Patient's PCP: Tim Ordoñez MD  Admit Date: 2024   Discharge Date: 2024  Length of Stay: 1  Code Status:  Full Code  Admitting Physician: Harrison Shaw Sra, MD  Discharge Physician: DAVE Aparicio     Active Discharge Diagnoses:     Hospital Problem Lists:  Principal Problem:    Dizziness  Active Problems:    Hypokalemia    Essential hypertension    BPH (benign prostatic hyperplasia)  Resolved Problems:    * No resolved hospital problems. *      Admission Condition:  fair     Discharged Condition: good    Hospital Stay:     Hospital Course:  Capo Brooks is a 69 y.o. male who was admitted for the management of Dizziness , presented to ER with Dizziness, Nausea, and Emesis (Pt arrives via ems from home dt co

## 2024-08-23 ENCOUNTER — HOSPITAL ENCOUNTER (OUTPATIENT)
Age: 69
Setting detail: THERAPIES SERIES
Discharge: HOME | End: 2024-08-23
Payer: MEDICARE

## 2024-08-23 PROCEDURE — 95992 CANALITH REPOSITIONING PROC: CPT

## 2024-08-23 PROCEDURE — 97110 THERAPEUTIC EXERCISES: CPT

## 2024-08-23 PROCEDURE — 97116 GAIT TRAINING THERAPY: CPT

## 2024-08-23 NOTE — FLOWSHEET NOTE
[] [facility]  Outpatient Rehabilitation &  Therapy  [address]  [phone]  [phone] [] [facility]  Outpatient Rehabilitation &  Therapy  [address]  P: [phone]  F: [phone] [] [facility]  Outpatient Rehabilitation &  Therapy  [address]  P: [phone]  F: [phone] [] [facility]  Outpatient Rehabilitation &  Therapy  [address]  [phone]  [phone] [] [facility]  Outpatient Rehabilitation &  Therapy  [address]   P: [phone]  F: [phone]  [x] [facility]  Outpatient Rehabilitation &  Therapy  [address]  P: [phone]  F: [phone] [] [facility]  Outpatient Rehabilitation &  Therapy  [address]  P: [phone]  F: [phone] [] [facility]  Outpatient Rehabilitation &  Therapy  [address]  P: [phone]  F: [phone] [] [facility]  Outpatient Rehabilitation &  Therapy  [address]  P: [phone]  F: [phone]  [] [facility] Outpatient Rehabilitation &  Therapy  [address]  P: [phone]  F: [phone]     Physical Therapy Daily Treatment Note    Date:  2024  Patient Name:  Capo Brooks    :  1955  MRN: [id number]  Physician: Tim Ordoñez MD                              Insurance:  MEDICARE PART A AND B   Medical Diagnosis: R42 Dizziness               Rehab Codes: R42, R26.89, H81.13  Onset date: 2024                       Next 's appt.: TBD     Visit# / total visits: ;                                                                            Progress note for Medicare patient due at visit 10     Cancels/No Shows: 0/0    Subjective:  Presents with improved steadiness. States he notices increased dizziness with L head

## 2024-08-26 ENCOUNTER — HOSPITAL ENCOUNTER (OUTPATIENT)
Age: 69
Setting detail: THERAPIES SERIES
End: 2024-08-26
Payer: MEDICARE

## 2024-08-27 ENCOUNTER — HOSPITAL ENCOUNTER (OUTPATIENT)
Age: 69
Setting detail: THERAPIES SERIES
Discharge: HOME | End: 2024-08-27
Payer: MEDICARE

## 2024-08-27 PROCEDURE — 97110 THERAPEUTIC EXERCISES: CPT | Performed by: PHYSICAL THERAPIST

## 2024-08-27 PROCEDURE — 97140 MANUAL THERAPY 1/> REGIONS: CPT | Performed by: PHYSICAL THERAPIST

## 2024-08-27 NOTE — FLOWSHEET NOTE
[] [facility]  Outpatient Rehabilitation &  Therapy  [address]  [phone]  [phone] [] [facility]  Outpatient Rehabilitation &  Therapy  [address]  P: [phone]  F: [phone] [] [facility]  Outpatient Rehabilitation &  Therapy  [address]  P: [phone]  F: [phone] [] [facility]  Outpatient Rehabilitation &  Therapy  [address]  [phone]  [phone] [] [facility]  Outpatient Rehabilitation &  Therapy  [address]   P: [phone]  F: [phone]  [x] [facility]  Outpatient Rehabilitation &  Therapy  [address]  P: [phone]  F: [phone] [] [facility]  Outpatient Rehabilitation &  Therapy  [address]  P: [phone]  F: [phone] [] [facility]  Outpatient Rehabilitation &  Therapy  [address]  P: [phone]  F: [phone] [] [facility]  Outpatient Rehabilitation &  Therapy  [address]  P: [phone]  F: [phone]  [] [facility] Outpatient Rehabilitation &  Therapy  [address]  P: [phone]  F: [phone]     Physical Therapy Daily Treatment Note    Date:  2024  Patient Name:  Capo Brooks    :  1955  MRN: [id number]  Patient: Capo Brooks              : 1955                      MRN: [id number]  Physician: Tim Ordoñez MD                                        Insurance: MEDICARE PART A AND B   Medical Diagnosis: M51.36, M54.16              Rehab Codes: M54.50, M25.60  Onset Date: 7/15/23                                  Next 's appt: TBD   Visit# / total visits: ; Progress note for Medicare patient due at visit 10     Cancels/No Shows: 0/0  Estimated Medicare Charges to 24 : $334    Subjective:  Reports mild and  less frequent L lower leg numbness, reduced L LBP as well.   Pain:  [x] Yes  [] No Location: L LS, L AL lower leg Pain Rating: (0-10 scale) 0-2/10  Pain altered Tx:  [] No  [] Yes  Action: N/A    Objective:  (8/27/24) Full bilat K-C flexion provokes mild end range L lower leg symptoms.   (8/8/24) Pelvis symmetrical. L hip abd 4-4+/5, no pain.FIS finger tips to lower shins with apprehension, gaurding and bilat HS pain. EIS 40 degrees very comfortable. LTR 75%, bilat K-C flexion pain free at 75%. HS length actively in supine 90/90 position -20 degrees from neutral.  Modalities: None  Precautions:None  Exercises:  Exercise Reps/ Time Weight/ Level Comments   Roll for control      Bridging on heels 10 x2  With ball squeeze   Reverse crunch 10x2  With ball squeeze   RONNIE 2'     1/2 press ups 10x     Adv SLR 10x2     LTR 15x     Bilateral K-C 10x 3''     Active HS stretch 10x 5''  Supine, R/L   Supine piriformis stretch 10'' x5  R/L               Other:  MANUAL:  LLTX      Treatment Charges: Mins Units   []  Modalities     [x]  Ther Exercise 30 2   [x]  Manual Therapy 10 1   []  Ther Activities     []  Neuro Re-ed     []  Vasocompression     [] Gait     []  Other     Total Billable time 40 3       Assessment: [x] Progressing toward goals.    [] No change.     [x] Other:  Symptoms decreasing, less dysfunction. No symptoms with lumbar flexion or ext.   [x] Patient would continue to benefit from skilled physical therapy services in order to: assist in restoring symmetry to pelvis, strength L lower quarter and hip, improve lumbar ROM to allow self and management of pain and restoration of desired amb ADL's.        STG: (to be met in 6 treatments)  Restore symmetry to pelvis to reduce LSP and begin appropriate strengthening  ? Pain: Eliminate constant L LBP to allow pain free sitting   ? ROM: Lumbar ext to 45 degrees to assist with symptom relief and control   LTG: (to be met in 12 treatments)  ? Strength: 5/5 L hip abd, ext to

## 2024-08-29 ENCOUNTER — HOSPITAL ENCOUNTER (OUTPATIENT)
Age: 69
Setting detail: THERAPIES SERIES
End: 2024-08-29
Payer: MEDICARE

## 2025-07-20 ENCOUNTER — APPOINTMENT (OUTPATIENT)
Dept: GENERAL RADIOLOGY | Age: 70
End: 2025-07-20
Payer: MEDICARE

## 2025-07-20 ENCOUNTER — HOSPITAL ENCOUNTER (EMERGENCY)
Age: 70
Discharge: HOME | End: 2025-07-20
Attending: EMERGENCY MEDICINE
Payer: MEDICARE

## 2025-07-20 VITALS
WEIGHT: 141.09 LBS | SYSTOLIC BLOOD PRESSURE: 136 MMHG | DIASTOLIC BLOOD PRESSURE: 84 MMHG | OXYGEN SATURATION: 100 % | HEIGHT: 73 IN | BODY MASS INDEX: 18.7 KG/M2 | HEART RATE: 70 BPM | TEMPERATURE: 97.9 F | RESPIRATION RATE: 18 BRPM

## 2025-07-20 DIAGNOSIS — M75.52 SUBDELTOID BURSITIS OF LEFT SHOULDER JOINT: Primary | ICD-10-CM

## 2025-07-20 PROCEDURE — 73030 X-RAY EXAM OF SHOULDER: CPT

## 2025-07-20 PROCEDURE — 99283 EMERGENCY DEPT VISIT LOW MDM: CPT

## 2025-07-20 RX ORDER — PREDNISONE 20 MG/1
60 TABLET ORAL DAILY
Qty: 15 TABLET | Refills: 0 | Status: SHIPPED | OUTPATIENT
Start: 2025-07-20 | End: 2025-07-25

## 2025-07-20 ASSESSMENT — PAIN DESCRIPTION - ORIENTATION: ORIENTATION: LEFT

## 2025-07-20 ASSESSMENT — PAIN - FUNCTIONAL ASSESSMENT: PAIN_FUNCTIONAL_ASSESSMENT: 0-10

## 2025-07-20 ASSESSMENT — PAIN DESCRIPTION - LOCATION: LOCATION: SHOULDER

## 2025-07-20 ASSESSMENT — PAIN DESCRIPTION - DESCRIPTORS: DESCRIPTORS: ACHING

## 2025-07-20 ASSESSMENT — PAIN SCALES - GENERAL: PAINLEVEL_OUTOF10: 8

## 2025-07-20 ASSESSMENT — PAIN DESCRIPTION - PAIN TYPE: TYPE: ACUTE PAIN

## 2025-07-20 NOTE — DISCHARGE INSTRUCTIONS
Follow closely with primary care physician or orthopedic surgeon.  Consider repeat imaging or further evaluation if symptoms are not improving within the week.  Return for numbness, tingling, weakness, severe pain not improving, or any other worsening symptoms or concerns

## 2025-07-20 NOTE — ED PROVIDER NOTES
[facility] Emergency Department  [address]  [place] [address]  Phone: [phone]  Fax: [phone]      Attending Physician Attestation    Based on the medical record, the care appears appropriate. I was personally available for consultation in the Emergency Department. I did have a discussion with our midlevel provider regarding the care of this patient.  I reviewed the mid level provider's note and agree with the documented findings and plan of care.   I have reviewed the emergency nurses triage note. I agree with the chief complaint, past medical history, past surgical history, allergies, medications, social and family history as documented unless otherwise noted below.       CHIEF COMPLAINT       Chief Complaint   Patient presents with    Shoulder Injury     Left shoulder injury.  Onset 1 day ago.  Pt describes moving furniture and awkward left arm position, causing pain.           PAST MEDICAL HISTORY    has no past medical history on file.    SURGICAL HISTORY      has no past surgical history on file.    CURRENT MEDICATIONS       Previous Medications    ASPIRIN 81 MG CHEWABLE TABLET    Take 1 tablet by mouth daily    ATORVASTATIN (LIPITOR) 80 MG TABLET    Take 1 tablet by mouth nightly    LISINOPRIL (PRINIVIL;ZESTRIL) 10 MG TABLET    Take 1 tablet by mouth daily    TAMSULOSIN (FLOMAX) 0.4 MG CAPSULE    Take 1 capsule by mouth daily       ALLERGIES     is allergic to codeine.      (Please note that portions of this note were completed with a voice recognition program.  Efforts were made to edit the dictations but occasionally words are mis-transcribed.)    Jordan Lockett DO, DO  Attending Emergency Physician        Jordan Lockett DO  07/20/25 0923    
    Socioeconomic History    Marital status:    Tobacco Use    Smoking status: Never    Smokeless tobacco: Never   Substance and Sexual Activity    Alcohol use: Not Currently    Drug use: Never     Social Drivers of Health     Financial Resource Strain: Low Risk  (3/9/2020)    [facility] [employer]    Overall Financial Resource Strain (CARDIA)     Difficulty of Paying Living Expenses: Not hard at all   Food Insecurity: No Food Insecurity (5/19/2025)    Received from [employer]    Hunger Screening     Within the past 12 months we worried whether our food would run out before we got money to buy more.: Never True     Within the past 12 months the food we bought just didn't last and we didn't have money to get more.: Never True   Transportation Needs: No Transportation Needs (8/20/2024)    PRAPARE - Transportation     Lack of Transportation (Medical): No     Lack of Transportation (Non-Medical): No   Physical Activity: Insufficiently Active (3/9/2020)    [facility] [employer], [facility]    Exercise Vital Sign     Days of Exercise per Week: 3 days     Minutes of Exercise per Session: 40 min   Stress: No Stress Concern Present (3/9/2020)    [facility] [employer], [facility]    [place] [employer] - Occupational Stress Questionnaire     Feeling of Stress : Only a little   Social Connections: Unknown (3/9/2020)    Received from [employer], [facility] [employer] [facility]    Social Connection and Isolation Panel [NHANES]     Frequency of Communication with Friends and Family: More than three times a week     Frequency of Social Gatherings with Friends and Family: More than three times a week     Attends Mandaeism Services: Patient declined     Active Member of Clubs or Organizations: Yes     Attends Club or Organization Meetings: More than 4 times per year     Marital Status: